# Patient Record
(demographics unavailable — no encounter records)

---

## 2024-10-09 NOTE — PHYSICAL EXAM
Problem: Non-Violent Restraints  Goal: Removal from restraints as soon as assessed to be safe  2/26/2021 1748 by Elian Calderon RN  Outcome: Completed  2/26/2021 1417 by Elian Calderon RN  Outcome: Ongoing  Goal: No harm/injury to patient while restraints in use  2/26/2021 1748 by Elian Calderon RN  Outcome: Completed  2/26/2021 1417 by Elian Calderon RN  Outcome: Ongoing  Goal: Patient's dignity will be maintained  2/26/2021 1748 by Elian Calderon RN  Outcome: Completed  2/26/2021 1417 by Elian Calderon RN  Outcome: Ongoing [de-identified] : PHYSICAL EXAM RIGHT  KNEE  SWELLIING  AND STIFFNESS  AROM EXTENSION = 0 FLEXION = 115  SPECIAL TESTS  PATELLAR GRIND = NEG DRAWER  = NEG LACHMAN = NEG MACMURRAY = NEG   MOTOR = GROSSLY INTACT SENSORY = GROSSLY INTACT

## 2024-10-09 NOTE — HISTORY OF PRESENT ILLNESS
[de-identified] : FOLLOW UP LOCATION: RIGHT KNEE PAIN DURATION: PAIN STARTED - 3 YEARS AGO MONOVICS INJECTION TODAY- PROVIDED BY INSURANCE   PREVIOUS HPI CONTEXT: TRAUMATIC FELL AT HIS RESTUARANT  QUALITY: SHARP  RADIATING PAIN DOWN THE LEG INTERMEDIATE PAIN LEVEL: 2/10 TREATMENTS: PATIENT HAS TRIED ICING AND OTC PAIN MEDS DENIES HELPING WITH SX. AGGRAVATING FACTORS: PAIN WORSENS WHEN LAYING FLAT OR INACTIVE ASSOCIATED SYMPTOMS: TINGLING / SWELLING ASSOCIATED CLICKING/LOCKING/POPPING/GRINDING PREVIOUS CORTISONE - NO  PREVIOUS PHYSICAL THERPAY - NO PRIOR STUDIES: YES X-RAY

## 2024-10-09 NOTE — PROCEDURE
[de-identified] : VISCOSUPPLEMENT INJECTION RIGHT KNEE  Patient has demonstrated limited relief from NSAIDS, rest, exercises / PT , and after discussion of the risks and benefits, the patient has elected to proceed with a n ULTRASOUND GUIDED VISCOSUPPLEMENT injection into the RIGHT SupeLat PF Knee   Confirmed that the patient does not have history of prior adverse reactions, active, infections, or relevant allergies. There was no effusion, erythema, or warmth, and the skin was clear  The skin was sterilized with alcohol. Ethyl Chloride was used as a topical anesthetic. Routine sterile technique.    The KNEE JOINT  was injected utilizing ULTRASOUND GUIDANCE with MONOVISC 4CC. The injection was completed without complication and a bandage was applied.  The patient tolerated the procedure well and was given post-injection instructions.Rec: Cold therapy, analgesics, avoid heavy activity.  MEDICATION: MONOVISC 6CC, LOT [id number] EXP JAN -26, 2027  EFFUSION ASPIRATED  : 15 CC CLEAR YELLOW

## 2024-11-06 NOTE — DISCUSSION/SUMMARY
[de-identified] : Discussed my findings with the patient. I am recommending he see Dr. Salvatore Woody for consideration of cervical facet joint injections, contact information given. He will follow up with me in 2-3 months, sooner if there is an issue. All questions answered.

## 2024-11-06 NOTE — PHYSICAL EXAM
[de-identified] : Physical Exam:  General: patient is well developed, well nourished, in no acute distress, alert and oriented x 3.  Mood and affect: normal  Respiratory: no respiratory distress noted  Skin: no scars over spine, skin intact, no erythema, increased warmth  Alignment:The spine is well compensated in the coronal and sagittal plane.  Gait: The patient is able to toe walk and heel walk without difficulty.  Palpation: no tenderness to palpation cervical spine or paraspinal region  Range of motion: active Cervical spine ROM is significantly restricted in flexion/extension/rotation bilaterally  Neurologic Exam: Motor: Manual Muscle testing in the upper and lower extremities is 5 out of 5 in all muscle groups. There is no evidence of muscular atrophy in the upper extremities. Sensory: Sensation to light touch is grossly intact in the upper and lower extremities  Reflexes: DTR are present and symmetric throughout, negative angulo bilaterally, negative inverted radial reflex bilaterally, no clonus, plantar responses are flexor [de-identified] : reviewed

## 2024-11-06 NOTE — END OF VISIT
[FreeTextEntry3] :   This note was written by Lynsey Carrillo PA-C, acting as a scribe for Dr. Paolo Rosa. I, Dr. Paolo Rosa, have read and attest that all the information, medical decision-making, and discharge instructions within are true and accurate.  I, Dr. Paolo Rosa, personally performed the evaluation and management (E/M) services for this new patient. That E/M includes conducting the initial examination, assessing all conditions, and establishing the plan of care. Today, my ACP, Lynsey Carrillo PA-C, was here to observe my evaluation and management services for this patient to be followed going forward.

## 2024-11-06 NOTE — HISTORY OF PRESENT ILLNESS
[de-identified] : Follow up 11/6/24: Patient returns for follow up. He continues to have neck pain and stiffness. No new neurologic symptoms. Has not yet had cervical injections.

## 2024-11-19 NOTE — PHYSICAL EXAM
[Penis Abnormality] : normal circumcised penis [Scrotum] : the scrotum was normal [Testes Tenderness] : no tenderness of the testes [Normal] : normal gait, coordination grossly intact, no focal deficits and deep tendon reflexes were 2+ and symmetric [FreeTextEntry1] : Mildly inflamed urethral meatus with copious mucoid discharge. [de-identified] : Anxious/distracted affect

## 2024-11-19 NOTE — HISTORY OF PRESENT ILLNESS
[de-identified] : Patient returns for follow-up blood work and to discuss ongoing management of high risk sexual behavior. He reports that he had a sexual "indiscretion" about 2 weeks ago and since then, he has experienced increasing urethral discomfort with mucus discharge.  Is concerned for STD.  Request treatment "immediately ".   [FreeTextEntry1] : High risk sexual behavior Acute urethritis

## 2024-11-19 NOTE — ASSESSMENT
[FreeTextEntry1] : 1.)  Acute urethritis Presentation highly consistent with GC, especially in context of recent episode(s) of non-protected sexual intercourse.  Copious mucoid urethral discharge supports this diagnosis. Still awaiting results of GC/CT PCR (urine) sent yesterday but, based on presentation, will treat for GC today on an empiric basis.  Ceftriaxone 500 mg administered to left deltoid area. Will also treat for CT empirically.  Prescription submitted to pharmacy for doxycycline 100 mg with instructions to take 1 pill twice a day for the next week. Patient advised to avoid all sexual contact until he has completed the doxycycline treatment even though improvement in symptoms can be anticipated within the next 48 hours. Patient will contact me if symptoms persist.   #2) High risk sexual behavior Reviewed safe sex recommendations including #1) reduction in number of new contacts; #2) obtaining medical information if possible from new sexual contacts; #3) avoiding sexual contact if inebriated on any substance; and #4) consistent use of barrier protection/condoms by all people engaged in sexual activity at all times. Again emphasized that PReP medication is additive to the above measures not to substitute for them. STD blood work sent today.  Will refill Truvada pending negative HIV test.  #3) Elevated LDL REVIEWED serial lipid panel results which show variation in LDL from 145 in February down to 110 in August, now partially increased again to 126. Patient reminded about the rationale (to reduce vascular and other complications) as well as the goal (LDL under 110, ideally under 100) for management of this condition. Again reviewed lifestyle management including daily aerobic exercise as tolerated and structured diet with reduction in saturated fats and trans-fats.  Specific recommendations provided.  #4) Concern for low testosterone REVIEWED results of testosterone testing in August.  Total testosterone was in high normal range (660) and free testosterone was in mid-normal range (13.6).  Based on these results, patient was reassured that his testosterone levels are normal and that testosterone supplementation is not indicated.  Patient was advised against getting anabolic steroids at his gym or elsewhere due to risk of multiple side effects (reviewed).

## 2025-03-27 NOTE — HEALTH RISK ASSESSMENT
[Several Days (1)] : 4.) Feeling tired or having little energy? Several days [Not at All (0)] : 9.) Thoughts that you would be off dead or of hurting yourself in some way? Not at all [Mild] : Severity of Depression is Mild [NO] : No [Sexually Active] : sexually active [High Risk Behavior] : high risk behavior

## 2025-04-21 NOTE — PHYSICAL EXAM
[TextEntry] : PHYSICAL EXAM  General: The patient was alert, oriented and in no distress. Voice was clear.  Face: The patient had no facial asymmetry or mass. The skin was unremarkable.  Ears: Hearing normal to conversational voice External ears were normal without deformity. Ear canals- cerumen impaction  PROCEDURE- EAR MICROSCOPY AND CERUMEN REMOVAL  Indication: cerumen removal  Under the microscope, obstructing cerumen was removed atraumatically from the both ears with a suction, curette and/or forceps.  Canals: normal. no inflammation.  Tympanic membranes: Intact. no perforation or effusion.  Nose:  The external nose had no significant deformity. On anterior rhinoscopy, the nasal mucosa was clear.  The anterior septum was grossly midline. There were no visualized polyps, purulence  or masses.   Oral cavity: Oral mucosa- normal. Oral and base of tongue- clear and without mass. Gingival and buccal mucosa- moist and without lesions. Palate- the palate moved well. There was no cleft palate. There appeared to be good salivary flow.   Oral cavity/oropharynx- no pus, erythema or mass  Neck:  The neck was symmetrical. The parotid and submandibular glands were normal without masses. The trachea was midline and there was no unusual crepitus. Thyroid was smooth and nontender and no masses were palpated. No masses  Lymphatics: Cervical adenopathy- none.    AUDIOGRAM- test ordered and the results reviewed and discussed with the patient. [] Hearing-bilateral sensorineural hearing loss with a mild asymmetry in the left ear Word recognition-normal Tympanograms- AD- type Ad, AS- typeAd

## 2025-04-21 NOTE — HISTORY OF PRESENT ILLNESS
[de-identified] : ADRIAN LIRA is a 67 year old patient Here for ear evaluation.  He was found to have cerumen impaction on exam.  He has fullness and hearing loss with occasional tinnitus.  He feels that the hearing is worse on the left side.  He has no otorrhea.  He does not have vertigo but does have disequilibrium.  He occasionally feels like he falls to the left while walking.  He does not use Q-tips to clean the ears  No history of recurrent ear infections or prior otologic surgery Audiogram in the past may have shown a hearing loss that was worse on the left side.

## 2025-04-21 NOTE — ASSESSMENT
[FreeTextEntry1] : He had cerumen impaction bilaterally which was removed.  Audiogram showed a mild bilateral sensorineural hearing loss.  He did have an asymmetry in the left ear.  He does not have vertigo but does sometimes feel that he has off-balance when walking  PLAN  -findings and management options discussed in detail with the patient.  -good aural hygiene -avoid using cotton swabs in the ears -wax removal drops as needed.  -noise precautions -annual audiogram -I discussed the possibility of retrocochlear pathology causing the asymmetric hearing loss.  The options are observation with serial audiograms, ABR, and MRI to rule out retrocochlear pathology. The patient has opted for MRI. -HAE -follow up after the MRI -call and return earlier if any concerns.

## 2025-05-14 NOTE — ASSESSMENT
[FreeTextEntry1] : He has a mild bilateral sensorineural hearing loss with asymmetry in the left ear.  He also has occasional tinnitus and disequilibrium.  MRI was negative for IAC masses.  There was abnormal signal change in the left petrous apex.  CT scan was suggested to rule out cholesterol granuloma or other etiologies  Plan -Findings and management options were discussed with the patient. -Monitor hearing - He is going to be sent for a CT scan of temporal bones - Follow-up after the CT scan - Call and return earlier if any problems

## 2025-05-14 NOTE — HISTORY OF PRESENT ILLNESS
[Medical Office: ([facility])___] : at the medical office located in  [Telephone (audio)] : This telephonic visit was provided via audio only technology. [Patient preference] : patient preference. [de-identified] : - ADRIAN LIRA is a 67 year old patient With a history of mild bilateral sensorineural hearing loss with asymmetry in the left ear.  He occasionally has disequilibrium when walking and mild tinnitus.  He was sent for an MRI.  We reviewed the results.  The MRI showed mild mucosal thickening in the ethmoid sinuses.  There was also abnormal signal in the left petrous apex which could indicate cholesterol granuloma, hydrated mucocele, effusion, or other etiologies.    ENT history No history of recurrent ear infections or prior otologic surgery Audiogram in the past may have shown a hearing loss that was worse on the left side.

## 2025-06-13 NOTE — HISTORY OF PRESENT ILLNESS
[Medical Office: ([facility])___] : at the medical office located in  [Telephone (audio)] : This telephonic visit was provided via audio only technology. [Patient preference] : patient preference. [de-identified] : - ADRIAN LIRA is a 67 year old patient With a history of a mild sensorineural hearing loss with occasional disequilibrium when walking and tinnitus.  He was found to have mild mucosal thickening in the ethmoid sinuses and abnormal signal in the left petrous apex on MRI.  Is sent for CT scan of the temporal bones for follow-up.  The CT scan showed opacified asymmetric left petrous apical cells which appear somewhat confluent centrally.  This is nonspecific and can reflect trapped fluid although there is the possibility of developing mucocele or cluster granuloma.  He does have discomfort in the ears more so on the left side at night.  He scratches them with his knuckles.  He does not use Q-tips.  ENT history No history of recurrent ear infections or prior otologic surgery Audiogram in the past may have shown a hearing loss that was worse on the left side.

## 2025-06-13 NOTE — ASSESSMENT
[FreeTextEntry1] : He has a history of bilateral sensorineural hearing loss with tinnitus and disequilibrium.  He had the CT scan of the temporal bones.  We reviewed the results.  He has also developed discomfort in the ears at night.  We discussed possible etiologies such as ear infection and TMJ dysfunction.  Plan -Findings and management options were discussed with the patient. - Good ear hygiene reviewed.  He should avoid scratching his ears - Without examining his ears, it is difficult to know if he could have an ear infection.  He could consider trying eardrops if he wishes.  He is going to hold off for now - Monitor hearing - I am going to have him see my colleague, Dr. Mckeon, a neuro-otologist for evaluation for the petrous apex findings. - I have asked him to call me and return earlier if his symptoms worsen or he would like to have the ear evaluated.